# Patient Record
Sex: MALE | Race: WHITE | Employment: FULL TIME | URBAN - METROPOLITAN AREA
[De-identification: names, ages, dates, MRNs, and addresses within clinical notes are randomized per-mention and may not be internally consistent; named-entity substitution may affect disease eponyms.]

---

## 2021-12-21 ENCOUNTER — TELEPHONE (OUTPATIENT)
Dept: UROLOGY | Facility: CLINIC | Age: 41
End: 2021-12-21

## 2023-01-26 ENCOUNTER — TELEPHONE (OUTPATIENT)
Dept: OTOLARYNGOLOGY | Facility: CLINIC | Age: 43
End: 2023-01-26
Payer: COMMERCIAL

## 2023-02-02 ENCOUNTER — OFFICE VISIT (OUTPATIENT)
Dept: OTOLARYNGOLOGY | Facility: CLINIC | Age: 43
End: 2023-02-02
Payer: COMMERCIAL

## 2023-02-02 VITALS — BODY MASS INDEX: 22.99 KG/M2 | WEIGHT: 169.75 LBS | HEIGHT: 72 IN

## 2023-02-02 DIAGNOSIS — J34.3 HYPERTROPHY OF BOTH INFERIOR NASAL TURBINATES: ICD-10-CM

## 2023-02-02 DIAGNOSIS — J34.89 NASAL VALVE STENOSIS: ICD-10-CM

## 2023-02-02 DIAGNOSIS — J34.2 NASAL SEPTAL DEVIATION: ICD-10-CM

## 2023-02-02 DIAGNOSIS — J30.9 ALLERGIC RHINITIS, UNSPECIFIED SEASONALITY, UNSPECIFIED TRIGGER: ICD-10-CM

## 2023-02-02 DIAGNOSIS — J34.89 NASAL OBSTRUCTION: Primary | ICD-10-CM

## 2023-02-02 PROCEDURE — 99999 PR PBB SHADOW E&M-EST. PATIENT-LVL V: CPT | Mod: PBBFAC,,, | Performed by: OTOLARYNGOLOGY

## 2023-02-02 PROCEDURE — 99999 PR PBB SHADOW E&M-EST. PATIENT-LVL V: ICD-10-PCS | Mod: PBBFAC,,, | Performed by: OTOLARYNGOLOGY

## 2023-02-02 PROCEDURE — 99204 PR OFFICE/OUTPT VISIT, NEW, LEVL IV, 45-59 MIN: ICD-10-PCS | Mod: S$GLB,,, | Performed by: OTOLARYNGOLOGY

## 2023-02-02 PROCEDURE — 99204 OFFICE O/P NEW MOD 45 MIN: CPT | Mod: S$GLB,,, | Performed by: OTOLARYNGOLOGY

## 2023-02-02 RX ORDER — LEVOCETIRIZINE DIHYDROCHLORIDE 5 MG/1
5 TABLET, FILM COATED ORAL
COMMUNITY

## 2023-02-02 NOTE — PROGRESS NOTES
Subjective:       Patient ID: Adolfo Hilton is a 42 y.o. male.    Chief Complaint: Nasal Congestion    Adolfo is here for nasal obstruction.   Length of symptoms: years.  He does endorse history of allergic rhinitis.  He gets approx 1 episodes of sinusitis per year - usually will power through it and not need antibiotics. He takes Xyzal regularly, Flonase and Astelin regularly. He uses saline when he has post nasal drip. No issues with smell. He gets facial pressure / pain with infections.  He feels like allergies contribute to 50% of his breathing. When he goes out of town, he notices improvement (dry environment)  No previous nasal surgeries.   He denies regular episodes of reflux but does notice some improvement with antacids at times.     Patient validated questionnaires (if applicable):      %       No flowsheet data found.  No flowsheet data found.  No flowsheet data found.         Social History     Tobacco Use   Smoking Status Never   Smokeless Tobacco Not on file     Social History     Substance and Sexual Activity   Alcohol Use None          Objective:        Constitutional:   He is oriented to person, place, and time. He appears well-developed and well-nourished. He appears alert. He is active. Normal speech.      Head:  Normocephalic and atraumatic. Head is without TMJ tenderness. No scars. Salivary glands normal.  Facial strength is normal.      Ears:    Right Ear: No drainage or swelling. No middle ear effusion.   Left Ear: No drainage or swelling.  No middle ear effusion.     Nose:  Mucosal edema (primarily turbinate), rhinorrhea (mucoid rhinorrhea) and septal deviation (moderate to severe left with slight rotation of dorsal septum into left nasal valve) present. No sinus tenderness. Turbinate hypertrophy (bilateral).    Significant improvement in breathing with modified Elvie.  Significant improvement in breathing with focused decongestion along turbinates and at LNW, with even further improvement  modified Elvie.  Septal swell body edema contacting the head of inferior turbinate on the left    Narrow middle third bilaterally with nasal valve stenosis.    Mouth/Throat  Oropharynx clear and moist without lesions or asymmetry, normal uvula midline and mirror exam normal. Normal dentition. No uvula swelling, lacerations or trismus. No oropharyngeal exudate. Tonsillar erythema, tonsillar exudate.      Neck:  Full range of motion with neck supple and no adenopathy. Thyroid tenderness is present. No tracheal deviation, no edema, no erythema, normal range of motion, no stridor, no crepitus and no neck rigidity present. No thyroid mass present.     Cardiovascular:    Intact distal pulses and normal pulses.              Pulmonary/Chest:   Effort normal and breath sounds normal. No stridor.     Psychiatric:   His speech is normal and behavior is normal. His mood appears not anxious. His affect is not labile.     Neurological:   He is alert and oriented to person, place, and time. No sensory deficit.     Skin:   No abrasions, lacerations, lesions, or rashes. No abrasion and no bruising noted.       Tests / Results:  none    Assessment:       1. Nasal obstruction    2. Allergic rhinitis, unspecified seasonality, unspecified trigger    3. Hypertrophy of both inferior nasal turbinates    4. Nasal septal deviation    5. Nasal valve stenosis          Plan:         We discussed options of continuing medical therapy and procedural intervention for nasal obstruction. We discussed open septorhino and ITR vs. VivAer valve remodeling, turbinate reduction and swell body reduction. He would like to proceed with VivAer.  R/b/a discussed in detail

## 2025-07-03 ENCOUNTER — PROCEDURE VISIT (OUTPATIENT)
Dept: OTOLARYNGOLOGY | Facility: CLINIC | Age: 45
End: 2025-07-03
Payer: COMMERCIAL

## 2025-07-03 VITALS — BODY MASS INDEX: 23.02 KG/M2 | WEIGHT: 169.75 LBS

## 2025-07-03 DIAGNOSIS — J34.3 HYPERTROPHY OF BOTH INFERIOR NASAL TURBINATES: ICD-10-CM

## 2025-07-03 DIAGNOSIS — J34.89 NASAL OBSTRUCTION: Primary | ICD-10-CM

## 2025-07-03 PROCEDURE — 30801 ABLATE INF TURBINATE SUPERF: CPT | Mod: S$GLB,,, | Performed by: OTOLARYNGOLOGY

## 2025-07-03 PROCEDURE — 99499 UNLISTED E&M SERVICE: CPT | Mod: S$GLB,,, | Performed by: OTOLARYNGOLOGY

## 2025-07-03 NOTE — PATIENT INSTRUCTIONS
Post-op VivAer Instructions  Adolfo Nunes MD  Department of Otolaryngology, Head and Neck Surgery  [facility]      PHONE NUMBERS:    Office number: [phone]  EMERGENCY: call [phone]  Nurses Line: [phone]  My cell phone: [phone]    WHAT TO EXPECT    You may experience some inflammation and tenderness at the treatment site.    You may experience some mild bloody discharge, especially after a nasal rinse.  Crusting is normal, and will occur for a few weeks following the procedure. They will begin to come off after 5-7 days and sometimes will need to be removed in the clinic at the post-operative visit.    It is not normal to have severe bright red bleeding from the nose following this procedure.  Call if this occurs.    If you need to blow your nose, please do so gently  Do not impinge 1 manipulate the treatment area  The best way to keep the nose as open as possible is to use saline.  This can be performed has a saline spray or nasal irrigation.  The following is some information on how to make nasal irrigation solution.    Nasal Irrigations  This will help remove the allergens, debris, and mucous from your nose to help you breathe. It will also clear it in preparation for other nasal medications.    To perform, purchase an over the counter sinus irrigation kit such as the [employer] Sinus Rinse Kit. Use as directed on the box. You should use distilled water or water that was previously boiled and left to cool. If you wish, you may make your own solution. However, salt packets are available in the nasal section in your  drug store.     A rough estimate for making salt solution is:  8oz water  2 teaspoons salt (pickling, roselyn or Kosher salt)  1 teaspoon baking soda    After each use, rinse the bottle with small amount of rubbing alcohol and clean with soap.  Replace the irrigation bottle if it becomes visibly soiled or every few weeks.      ACTIVITY    First 1-2 days after  surgery  Plan to rest. Light activity is OK. If you are feeling well, you can resume normal activities.  You may shower   You may gently blow nose    NUTRITION    You may resume a normal diet    MEDICATIONS    Resume you home medications    Call my office if you experience any of the following:    Fever higher than 101 F  Clear, watery nasal discharge  Any visual changes or marked swelling around the eyes  Severe headache or neck stiffness  Brisk bleeding

## 2025-07-03 NOTE — PROGRESS NOTES
07/03/2025     Adolfo Hilton  [id number]  1980    PRE-OPERATIVE DIAGNOSIS  1. Inferior turbinate hypertrophy  2. Nasal obstruction    POST-OPERATIVE DIAGNOSIS  Same    PROCEDURES PERFORMED  1. Transmucosal ablation of inferior turbinate    SURGEON: Adolfo Nunes MD    ASSISTANT:  None    ANESTHESIA: Local     COMPLICATIONS: None    BLOOD LOSS: minimal    SPECIMENS  None    INDICATIONS  Adolfo Hilton is a 44 y.o. male who was seen in clinic for evaluation of the above diagnosis. Based on clinical assessment and failure to improve with medical therapy, the following procedures were discussed as an option for treatment. After risks, benefits, and alternatives were discussed the patient decided to proceed.     PROCEDURE IN DETAIL  The patient was seen in the clinic, and consent was signed. The nasal cavity was anesthetized with topical 4% tetracaine and 10% lidocaine.  Local injection of 1% lidocaine was performed along the undersurface of the upper lateral and lower lateral cartilages and at the anterior head of the turbinates.  The [employer] device was used at the 's recommended settings. The wand was then placed at the head of the inferior turbinate and ablated along the anterior 1/2. The instrument was then removed. There was no bleeding.  An identical procedure was performed bilaterally.     The patient tolerated the procedure well.

## 2025-07-22 ENCOUNTER — OFFICE VISIT (OUTPATIENT)
Dept: OTOLARYNGOLOGY | Facility: CLINIC | Age: 45
End: 2025-07-22
Payer: COMMERCIAL

## 2025-07-22 VITALS — HEIGHT: 72 IN | BODY MASS INDEX: 22.99 KG/M2 | WEIGHT: 169.75 LBS

## 2025-07-22 DIAGNOSIS — J34.89 NASAL VESTIBULITIS: Primary | ICD-10-CM

## 2025-07-22 PROCEDURE — 1159F MED LIST DOCD IN RCRD: CPT | Mod: CPTII,S$GLB,, | Performed by: OTOLARYNGOLOGY

## 2025-07-22 PROCEDURE — 1160F RVW MEDS BY RX/DR IN RCRD: CPT | Mod: CPTII,S$GLB,, | Performed by: OTOLARYNGOLOGY

## 2025-07-22 PROCEDURE — 99024 POSTOP FOLLOW-UP VISIT: CPT | Mod: S$GLB,,, | Performed by: OTOLARYNGOLOGY

## 2025-07-22 PROCEDURE — 99999 PR PBB SHADOW E&M-EST. PATIENT-LVL III: CPT | Mod: PBBFAC,,, | Performed by: OTOLARYNGOLOGY

## 2025-07-22 RX ORDER — MUPIROCIN 20 MG/G
OINTMENT TOPICAL
Qty: 22 G | Refills: 1 | Status: SHIPPED | OUTPATIENT
Start: 2025-07-22 | End: 2025-07-22

## 2025-07-22 RX ORDER — MUPIROCIN 20 MG/G
OINTMENT TOPICAL
Qty: 22 G | Refills: 1 | Status: SHIPPED | OUTPATIENT
Start: 2025-07-22

## 2025-07-22 NOTE — PROGRESS NOTES
Adolfo is here for a post-operative visit following turbinate reduction    No issues, doing well      Exam:  Alert, active, appropriate  Mild crusting and vestibulitis bilaterally      Healing well  Mupirocin x 10 d